# Patient Record
Sex: MALE | Race: WHITE | NOT HISPANIC OR LATINO | URBAN - NONMETROPOLITAN AREA
[De-identification: names, ages, dates, MRNs, and addresses within clinical notes are randomized per-mention and may not be internally consistent; named-entity substitution may affect disease eponyms.]

---

## 2021-10-19 ENCOUNTER — IMPORTED ENCOUNTER (OUTPATIENT)
Dept: URBAN - NONMETROPOLITAN AREA CLINIC 1 | Facility: CLINIC | Age: 71
End: 2021-10-19

## 2021-10-19 PROBLEM — H25.13: Noted: 2021-10-19

## 2021-10-19 PROBLEM — H52.223: Noted: 2021-10-19

## 2021-10-19 PROBLEM — H52.4: Noted: 2021-10-19

## 2021-10-19 PROBLEM — H52.03: Noted: 2021-10-19

## 2021-10-19 PROCEDURE — 92015 DETERMINE REFRACTIVE STATE: CPT

## 2021-10-19 PROCEDURE — 92004 COMPRE OPH EXAM NEW PT 1/>: CPT

## 2021-10-19 NOTE — PATIENT DISCUSSION
Cataracts OU -  Discussed findings w/patient in detail-  Discussed signs and symptoms associated-  UV protection recommended-  Not visually significaitnt at  this time.  No treatment needed-  Continue to monitorHyperopia/Astigmatism/Presbyopia OU-  Discussed findings w/patient -  New spectacle Rx issued-  Continue to monitor -  RTC 1 year or PRN; 's Notes: PCP: Werner Bloch 10/19/2021DHERI 10/19/2021

## 2022-04-09 ASSESSMENT — TONOMETRY
OD_IOP_MMHG: 16
OS_IOP_MMHG: 16

## 2022-04-09 ASSESSMENT — VISUAL ACUITY
OU_CC: 20/20
OD_SC: 20/30
OS_SC: 20/30